# Patient Record
Sex: FEMALE | Race: OTHER
[De-identification: names, ages, dates, MRNs, and addresses within clinical notes are randomized per-mention and may not be internally consistent; named-entity substitution may affect disease eponyms.]

---

## 2021-02-06 ENCOUNTER — HOSPITAL ENCOUNTER (EMERGENCY)
Dept: HOSPITAL 61 - ER | Age: 26
Discharge: HOME | End: 2021-02-06
Payer: COMMERCIAL

## 2021-02-06 VITALS — BODY MASS INDEX: 25.56 KG/M2 | WEIGHT: 138.89 LBS | HEIGHT: 62 IN

## 2021-02-06 VITALS — DIASTOLIC BLOOD PRESSURE: 80 MMHG | SYSTOLIC BLOOD PRESSURE: 148 MMHG

## 2021-02-06 DIAGNOSIS — S50.12XA: Primary | ICD-10-CM
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
## 2021-02-06 PROCEDURE — 99284 EMERGENCY DEPT VISIT MOD MDM: CPT

## 2021-02-06 PROCEDURE — 73090 X-RAY EXAM OF FOREARM: CPT

## 2021-02-06 PROCEDURE — 73080 X-RAY EXAM OF ELBOW: CPT

## 2021-02-06 NOTE — PHYS DOC
Past Medical History


Past Medical History:  No Pertinent History


Past Surgical History:  No Surgical History


Smoking Status:  Never Smoker


Alcohol Use:  None





General Adult


EDM:


Chief Complaint:  UPPER EXTREMITY PAIN





HPI:


HPI:





Patient is a 25  year old female no significant medical history presenting to 

the ED today complaining of a sharp10 out of 10 left proximal forearm pain that 

began at 9 AM after she fell down 7 steps.  Patient denies any loss of 

consciousness.  Denies any head, neck mid or low back pain.  Denies any 

hematuria.  She states she hit her left forearm on the staircase.  States the 

pain is worse on range of motion.  Denies anything specifically relieving the 

pain.





Review of Systems:


Review of Systems:


Constitutional:   Denies fever or chills. []


:  Denies dysuria. [] 


Musculoskeletal:   Reports left forearm pain


Integument:   Denies rash. [] 


Neurologic:   Denies headache, focal weakness or sensory changes. []  


Psychiatric:  Denies depression or anxiety. []





Heart Score:


Risk Factors:


Risk Factors:  DM, Current or recent (<one month) smoker, HTN, HLP, family 

history of CAD, obesity.


Risk Scores:


Score 0 - 3:  2.5% MACE over next 6 weeks - Discharge Home


Score 4 - 6:  20.3% MACE over next 6 weeks - Admit for Clinical Observation


Score 7 - 10:  72.7% MACE over next 6 weeks - Early Invasive Strategies





Physical Exam:


PE:





Constitutional: Well developed, well nourished, no acute distress, non-toxic 

appearance. []


Skin: Warm, dry, no erythema, no rash. [] 


Back: No tenderness, no CVA tenderness. [] 


Extremities: Left lateral proximal forearm with mild soft tissue swelling and 

bruising.  Full range of motion to the left forearm including plantarflexion and

 dorsiflexion.  +2 left radial pulse.  Adequate radial, medial, ulnar sensation 

to the left upper extremity.  Cap refill less than 2 seconds in left fingers.


Neurologic: Alert and oriented X 3, normal motor function, normal sensory 

function, no focal deficits noted. []


Psychologic: Affect normal, judgement normal, mood normal. []





Current Patient Data:


Vital Signs:





                                   Vital Signs








  Date Time  Temp Pulse Resp B/P (MAP) Pulse Ox O2 Delivery O2 Flow Rate FiO2


 


2/6/21 13:42 98.8 61 16 148/80 (102) 100   





 98.8       











EKG:


EKG:


[]





Radiology/Procedures:


Radiology/Procedures:


[]PROCEDURE: FOREARM LEFT





Left elbow 3 views, left forearm 2 views.





HISTORY: Pain after a fall





Left elbow





3 views were taken the left elbow. There is a contraceptive device in the soft 

tissues of the forearm medially. There is no fracture at the elbow. Fat pads at 

the elbow are not displaced.





Left forearm





2 views were taken of the left forearm. There is not evidence of an acute fra

cture or osseous abnormality.





IMPRESSION:


1. No fracture noted at the left elbow.


2. No fracture noted in the left forearm





Electronically signed by: Linda Cardenas MD (2/6/2021 2:07 PM) [facility]














DICTATED and SIGNED BY:     LINDA CARDENAS MD


DATE:     02/06/21 [id number]





Course & Med Decision Making:


Course & Med Decision Making


Pertinent Labs and Imaging studies reviewed. (See chart for details)





This is a 25-year-old female patient presented to the ED today with left 

proximal forearm pain that began after she fell down 7 steps.  Left forearm and 

left elbow x-rays are negative for any acute findings.  Ace bandage applied to 

the left forearm by me, neurovascular exam is intact.  Ice elevation encouraged.

  Naproxen for pain.





Dragon Disclaimer:


Dragon Disclaimer:


This electronic medical record was generated, in whole or in part, using a voice

 recognition dictation system.





Departure


Departure


Impression:  


   Primary Impression:  


   Fall down steps


   Qualified Codes:  W10.8XXA - Fall (on) (from) other stairs and steps, initial

    encounter


   Additional Impression:  


   Contusion of left forearm


   Qualified Codes:  S50.12XA - Contusion of left forearm, initial encounter


Disposition:  01 DC HOME SELF CARE/HOMELESS


Condition:  STABLE


Referrals:  


UNKNOWN PCP NAME (PCP)








GISELL PATEL II, MD


follow up in 1-2 weeks


Patient Instructions:  Contusion, Easy-to-Read, Fall Prevention and Home Safety





Additional Instructions:  


You were evaluated in the emergency room for left forearm pain.  Your left 

forearm and left elbow x-rays are negative for any acute findings.  You likely 

have a contusion to the left forearm.  Try to ice and elevate the extremity.  

Wear the Ace bandage as tolerated.  Follow-up with the provided orthopedic 

doctor or your own doctor in 1 to 2 weeks as needed


Scripts


Naproxen (NAPROXEN) 500 Mg Tablet


1 TAB PO BID PRN for PAIN, #30 TAB 0 Refills


   Prov: TRAVON FREITAS         2/6/21











TRAVON FREITAS               Feb 6, 2021 14:26

## 2021-02-06 NOTE — RAD
Left elbow 3 views, left forearm 2 views.



HISTORY: Pain after a fall



Left elbow



3 views were taken the left elbow. There is a contraceptive device in the soft tissues of the forearm
 medially. There is no fracture at the elbow. Fat pads at the elbow are not displaced.



Left forearm



2 views were taken of the left forearm. There is not evidence of an acute fracture or osseous abnorma
lity.



IMPRESSION:

1. No fracture noted at the left elbow.

2. No fracture noted in the left forearm



Electronically signed by: Pavel Zuniga MD (2/6/2021 2:07 PM) [facility]

## 2021-02-06 NOTE — RAD
Left elbow 3 views, left forearm 2 views.



HISTORY: Pain after a fall



Left elbow



3 views were taken the left elbow. There is a contraceptive device in the soft tissues of the forearm
 medially. There is no fracture at the elbow. Fat pads at the elbow are not displaced.



Left forearm



2 views were taken of the left forearm. There is not evidence of an acute fracture or osseous abnorma
lity.



IMPRESSION:

1. No fracture noted at the left elbow.

2. No fracture noted in the left forearm



Electronically signed by: Pavel Zuniga MD (2/6/2021 2:07 PM) [facility]

## 2021-09-10 ENCOUNTER — HOSPITAL ENCOUNTER (EMERGENCY)
Dept: HOSPITAL 61 - ER | Age: 26
Discharge: HOME | End: 2021-09-10
Payer: COMMERCIAL

## 2021-09-10 VITALS — WEIGHT: 165.57 LBS | HEIGHT: 61 IN | BODY MASS INDEX: 31.26 KG/M2

## 2021-09-10 VITALS — DIASTOLIC BLOOD PRESSURE: 82 MMHG | SYSTOLIC BLOOD PRESSURE: 137 MMHG

## 2021-09-10 DIAGNOSIS — H11.31: Primary | ICD-10-CM

## 2021-09-10 PROCEDURE — 99283 EMERGENCY DEPT VISIT LOW MDM: CPT

## 2021-09-10 NOTE — PHYS DOC
Past Medical History


Past Medical History:  No Pertinent History


Past Surgical History:  No Surgical History


Smoking Status:  Never Smoker


Alcohol Use:  None





General Adult


EDM:


Chief Complaint:  EYE PROBLEMS





HPI:


HPI:





Patient is a 26  year old female without pertinent past medical history who 

presents with a red and painful eye.


States that she was playing with her children when she was hit in the eye on 

accident last night about 7 PM.  Was slightly painful when she went to bed.


When she woke up today she had red/bloody conjunctivae so came in to the 

emergency department.


No vision loss.





Review of Systems:


Review of Systems:


Constitutional:   Denies fever or chills. []


Eyes:   Right eye pain and redness. []


HENT:   Denies nasal congestion or sore throat. [] 


Respiratory:   Denies cough or shortness of breath. [] 


Cardiovascular:   Denies chest pain or edema. [] 


GI:   Denies abdominal pain, nausea, vomiting, bloody stools or diarrhea. [] 


:  Denies dysuria. [] 


Musculoskeletal:   Denies back pain or joint pain. [] 


Integument:   Denies rash. [] 


Neurologic:   Denies headache, focal weakness or sensory changes. [] 


Endocrine:   Denies polyuria or polydipsia. [] 


Lymphatic:  Denies swollen glands. [] 


Psychiatric:  Denies depression or anxiety. []





Heart Score:


C/O Chest Pain:  N/A


Risk Factors:


Risk Factors:  DM, Current or recent (<one month) smoker, HTN, HLP, family hi

story of CAD, obesity.


Risk Scores:


Score 0 - 3:  2.5% MACE over next 6 weeks - Discharge Home


Score 4 - 6:  20.3% MACE over next 6 weeks - Admit for Clinical Observation


Score 7 - 10:  72.7% MACE over next 6 weeks - Early Invasive Strategies





Current Medications:





Current Medications








 Medications


  (Trade)  Dose


 Ordered  Sig/Sekou  Start Time


 Stop Time Status Last Admin


Dose Admin


 


 Fluorescein Sodium


  (Ful-Cecilia)  1 strip  1X  ONCE  9/10/21 14:30


 9/10/21 14:31 DC  





 


 Tetracaine HCl


  (Tetracaine)  1 drop  1X  ONCE  9/10/21 14:30


 9/10/21 14:31 DC  














Allergies:


Allergies:





Allergies








Coded Allergies Type Severity Reaction Last Updated Verified


 


  No Known Drug Allergies    9/10/21 No











Physical Exam:


PE:





Constitutional: Well developed, well nourished, no acute distress, non-toxic 

appearance. []


HENT: Normocephalic, atraumatic, bilateral external ears normal, oropharynx 

moist, no oral exudates, nose normal. []


Eyes: Vision grossly intact.  Pupils equal, reactive to light, EOMs intact in 

all directions.  No evidence of hyphema.  There is subconjunctival hemorrhage on

 the lateral right eye.


Fluorescein exam negative.  No evidence of Merle sign/open globe.  No evidence 

of facial bruising or instability [] 


Neck: Normal range of motion, no tenderness, supple, no stridor. [] 


Cardiovascular:Heart rate regular rhythm, no murmur []


Lungs & Thorax: Normal respiratory effort.  Normal chest excursion.


Abdomen: Bowel sounds normal, soft, no tenderness, no masses, no pulsatile 

masses. [] 


Skin: Warm, dry, no erythema, no rash. [] 


Back: No tenderness, no CVA tenderness. [] 


Extremities: No tenderness, no cyanosis, no clubbing, ROM intact, no edema. [] 


Neurologic: Alert and oriented X 3, normal motor function, normal sensory 

function, no focal deficits noted. []


Psychologic: Affect normal, judgement normal, mood normal. []





Current Patient Data:


Vital Signs:





                                   Vital Signs








  Date Time  Temp Pulse Resp B/P (MAP) Pulse Ox O2 Delivery O2 Flow Rate FiO2


 


9/10/21 14:15 98.9 82 16 126/91 (103) 96 Room Air  





 98.9       











EKG:


EKG:


[]





Radiology/Procedures:


Radiology/Procedures:


[]





Course & Med Decision Making:


Course & Med Decision Making


Pertinent Labs and Imaging studies reviewed. (See chart for details)





Patient is 26-year-old female who presents with a subconjunctival hemorrhage and

 hit in the eye by her child last night.


Vision intact.  No evidence of hyphema or traumatic iritis.


No evidence of facial trauma, low concern for retrobulbar hematoma. 


No evidence of open globe.


No evidence of corneal abrasion.


This looks to be a simple Subconjunctival hemorrhage that can be managed 

expectantly.





[id number]





Dragon Disclaimer:


Dragon Disclaimer:


This electronic medical record was generated, in whole or in part, using a voice

 recognition dictation system.





Departure


Departure


Impression:  


   Primary Impression:  


   Subconjunctival hemorrhage of right eye


Disposition:  01 HOME / SELF CARE / HOMELESS


Condition:  STABLE


Referrals:  


UNKNOWN PCP NAME (PCP)


Patient Instructions:  Subconjunctival Hemorrhage











SEDRICK NAZARIO MD              Sep 10, 2021 14:52

## 2022-05-17 ENCOUNTER — HOSPITAL ENCOUNTER (EMERGENCY)
Dept: HOSPITAL 61 - ER | Age: 27
LOS: 1 days | Discharge: STILL ADMITTED OR ADMINISTRATIVE | End: 2022-05-18
Payer: COMMERCIAL

## 2022-05-17 VITALS — WEIGHT: 145.28 LBS | HEIGHT: 63 IN | BODY MASS INDEX: 25.74 KG/M2

## 2022-05-17 DIAGNOSIS — L03.115: Primary | ICD-10-CM

## 2022-05-17 PROCEDURE — 73590 X-RAY EXAM OF LOWER LEG: CPT

## 2022-05-17 PROCEDURE — 90715 TDAP VACCINE 7 YRS/> IM: CPT

## 2022-05-17 PROCEDURE — 99283 EMERGENCY DEPT VISIT LOW MDM: CPT

## 2022-05-17 PROCEDURE — 90471 IMMUNIZATION ADMIN: CPT

## 2022-05-18 VITALS — DIASTOLIC BLOOD PRESSURE: 95 MMHG | SYSTOLIC BLOOD PRESSURE: 150 MMHG

## 2022-05-18 NOTE — PHYS DOC
Past Medical History


Past Medical History:  No Pertinent History


Past Surgical History:  No Surgical History


Smoking Status:  Never Smoker


Alcohol Use:  None





General Adult


EDM:


Chief Complaint:  MECHANICAL FALL





HPI:


HPI:





Patient is a 27  year old female that presents with right leg pain.  Onset 

yesterday.  She was walking up the steps when she tripped forward and hit the 

right anterior shin onto one of the steps.  Denies any falls or head injuries.  

Since then the injured area has become reddened and more painful.  She has been 

scratching it.  No other significant past medical history.  Denies any history 

of diabetes.  No vomiting or diarrhea





Review of Systems:


Review of Systems:


Constitutional:   Denies fever or chills. []


Eyes:   Denies change in visual acuity. []


HENT:   Denies nasal congestion or sore throat. [] 


Respiratory:   Denies cough or shortness of breath. [] 


Cardiovascular:   Denies chest pain or edema. [] 


GI:   Denies abdominal pain, nausea, vomiting, bloody stools or diarrhea. [] 


:  Denies dysuria. [] 


Musculoskeletal:   Right tib-fib pain


Integument:   Denies rash. [] 


Neurologic:   Denies headache, focal weakness or sensory changes. [] 


Endocrine:   Denies polyuria or polydipsia. [] 


Lymphatic:  Denies swollen glands. [] 


Psychiatric:  Denies depression or anxiety. []





Heart Score:


C/O Chest Pain:  No


Risk Factors:


Risk Factors:  DM, Current or recent (<one month) smoker, HTN, HLP, family 

history of CAD, obesity.


Risk Scores:


Score 0 - 3:  2.5% MACE over next 6 weeks - Discharge Home


Score 4 - 6:  20.3% MACE over next 6 weeks - Admit for Clinical Observation


Score 7 - 10:  72.7% MACE over next 6 weeks - Early Invasive Strategies





Current Medications:





Current Medications








 Medications


  (Trade)  Dose


 Ordered  Sig/Sekou  Start Time


 Stop Time Status Last Admin


Dose Admin


 


 Cephalexin HCl


  (Keflex)  500 mg  BID  5/18/22 01:01


    5/18/22 01:12


500 MG


 


 Diphtheria/


 Tetanus/Acell


 Pertussis


  (Boostrix)  0.5 ml  ONCE ONCE  5/18/22 01:00


 5/18/22 01:01 DC 5/18/22 01:14


0.5 ML











Allergies:


Allergies:





Allergies








Coded Allergies Type Severity Reaction Last Updated Verified


 


  No Known Drug Allergies    9/10/21 No











Physical Exam:


PE:





Constitutional: Well developed, well nourished, no acute distress, non-toxic 

appearance. []


HENT: Normocephalic, atraumatic, bilateral external ears normal, oropharynx 

moist, no oral exudates, nose normal. []


Eyes: PERRLA, EOMI, conjunctiva normal, no discharge. [] 


Neck: Normal range of motion, no tenderness, supple, no stridor. [] 


Cardiovascular:Heart rate regular rhythm, no murmur []


Lungs & Thorax:  Bilateral breath sounds clear to auscultation []


Abdomen: Bowel sounds normal, soft, no tenderness, no masses, no pulsatile 

masses. [] 


Skin: Skin over the right middle tib-fib anteriorly shows a healing scab with 

surrounding erythema and slight induration measuring approximately 3 to 4 inches

 in diameter


Back: No tenderness, no CVA tenderness. [] 


Extremities: No tenderness, no cyanosis, no clubbing, ROM intact, no edema. [] 


Neurologic: Alert and oriented X 3, normal motor function, normal sensory 

function, no focal deficits noted. []


Psychologic: Affect normal, judgement normal, mood normal. []





Current Patient Data:


Vital Signs:





                                   Vital Signs








  Date Time  Temp Pulse Resp B/P (MAP) Pulse Ox O2 Delivery O2 Flow Rate FiO2


 


5/18/22 00:40 98.6 90 16 150/95 (113) 99 Room Air  





 98.6       











EKG:


EKG:


[]





Radiology/Procedures:


Radiology/Procedures:


[]





Course & Med Decision Making:


Course & Med Decision Making


Pertinent Labs and Imaging studies reviewed. (See chart for details)


Patient has obvious small amount of cellulitis on examination.  Will give first 

dose of p.o. antibiotics in ED and discharge.  Tetanus shot given as well.  X-

rays are unremarkable.





Dragon Disclaimer:


Dragon Disclaimer:


This electronic medical record was generated, in whole or in part, using a voice

 recognition dictation system.





Departure


Departure


Impression:  


   Primary Impression:  


   Cellulitis of right lower extremity


Disposition:  09 ADMITTED AS INPATIENT


Condition:  STABLE


Referrals:  


UNKNOWN PCP NAME (PCP)


Patient Instructions:  Cellulitis, Easy-to-Read


Scripts


Cephalexin (KEFLEX) 500 Mg Capsule


500 MG PO QID for 7 Days, #28 CAP


   Prov: AMITA VILLAFANA MD         5/18/22











AMITA VILLAFANA MD                 May 18, 2022 01:48

## 2022-05-18 NOTE — RAD
XR RT TIBIA+FIBULA  5/18/2022 1:02 AM



INDICATION: Trauma, pain



COMPARISON: None available.



TECHNIQUE:  2 views of the right tibia and fibula are provided.



FINDINGS/

IMPRESSION:

There is no acute fracture or dislocation. Joint spaces are maintained. Bone mineralization is within
 normal limits. Regional soft tissues are within normal limits. There is no soft tissue gas or osseou
s erosion. No radiopaque foreign body.



Electronically signed by: Citlaly Price MD (5/18/2022 1:27 AM) [facility]